# Patient Record
Sex: MALE | Race: WHITE | NOT HISPANIC OR LATINO | Employment: OTHER | URBAN - METROPOLITAN AREA
[De-identification: names, ages, dates, MRNs, and addresses within clinical notes are randomized per-mention and may not be internally consistent; named-entity substitution may affect disease eponyms.]

---

## 2017-11-08 ENCOUNTER — APPOINTMENT (OUTPATIENT)
Dept: ULTRASOUND IMAGING | Facility: CLINIC | Age: 72
End: 2017-11-08
Attending: EMERGENCY MEDICINE
Payer: COMMERCIAL

## 2017-11-08 ENCOUNTER — HOSPITAL ENCOUNTER (EMERGENCY)
Facility: CLINIC | Age: 72
Discharge: HOME | End: 2017-11-08
Attending: EMERGENCY MEDICINE | Admitting: EMERGENCY MEDICINE
Payer: COMMERCIAL

## 2017-11-08 VITALS
RESPIRATION RATE: 16 BRPM | DIASTOLIC BLOOD PRESSURE: 74 MMHG | SYSTOLIC BLOOD PRESSURE: 113 MMHG | HEART RATE: 75 BPM | TEMPERATURE: 97.3 F | OXYGEN SATURATION: 93 %

## 2017-11-08 DIAGNOSIS — R74.8 ELEVATED LIPASE: ICD-10-CM

## 2017-11-08 DIAGNOSIS — R10.10 UPPER ABDOMINAL PAIN: ICD-10-CM

## 2017-11-08 LAB
ALBUMIN SERPL-MCNC: 3.3 G/DL (ref 3.4–5)
ALBUMIN UR-MCNC: NEGATIVE MG/DL
ALP SERPL-CCNC: 63 U/L (ref 40–150)
ALT SERPL W P-5'-P-CCNC: 16 U/L (ref 0–70)
ANION GAP SERPL CALCULATED.3IONS-SCNC: 6 MMOL/L (ref 3–14)
APPEARANCE UR: CLEAR
AST SERPL W P-5'-P-CCNC: 11 U/L (ref 0–45)
BASOPHILS # BLD AUTO: 0 10E9/L (ref 0–0.2)
BASOPHILS NFR BLD AUTO: 0.2 %
BILIRUB SERPL-MCNC: 0.8 MG/DL (ref 0.2–1.3)
BILIRUB UR QL STRIP: NEGATIVE
BUN SERPL-MCNC: 13 MG/DL (ref 7–30)
CALCIUM SERPL-MCNC: 8.2 MG/DL (ref 8.5–10.1)
CHLORIDE SERPL-SCNC: 105 MMOL/L (ref 94–109)
CO2 SERPL-SCNC: 27 MMOL/L (ref 20–32)
COLOR UR AUTO: YELLOW
CREAT SERPL-MCNC: 0.97 MG/DL (ref 0.66–1.25)
DIFFERENTIAL METHOD BLD: ABNORMAL
EOSINOPHIL # BLD AUTO: 0.1 10E9/L (ref 0–0.7)
EOSINOPHIL NFR BLD AUTO: 0.5 %
ERYTHROCYTE [DISTWIDTH] IN BLOOD BY AUTOMATED COUNT: 12.2 % (ref 10–15)
GFR SERPL CREATININE-BSD FRML MDRD: 76 ML/MIN/1.7M2
GLUCOSE SERPL-MCNC: 112 MG/DL (ref 70–99)
GLUCOSE UR STRIP-MCNC: NEGATIVE MG/DL
HCT VFR BLD AUTO: 40.4 % (ref 40–53)
HGB BLD-MCNC: 14.2 G/DL (ref 13.3–17.7)
HGB UR QL STRIP: NEGATIVE
IMM GRANULOCYTES # BLD: 0.1 10E9/L (ref 0–0.4)
IMM GRANULOCYTES NFR BLD: 0.4 %
INTERPRETATION ECG - MUSE: NORMAL
KETONES UR STRIP-MCNC: NEGATIVE MG/DL
LEUKOCYTE ESTERASE UR QL STRIP: NEGATIVE
LIPASE SERPL-CCNC: 905 U/L (ref 73–393)
LYMPHOCYTES # BLD AUTO: 2 10E9/L (ref 0.8–5.3)
LYMPHOCYTES NFR BLD AUTO: 13.7 %
MCH RBC QN AUTO: 32.7 PG (ref 26.5–33)
MCHC RBC AUTO-ENTMCNC: 35.1 G/DL (ref 31.5–36.5)
MCV RBC AUTO: 93 FL (ref 78–100)
MONOCYTES # BLD AUTO: 1.4 10E9/L (ref 0–1.3)
MONOCYTES NFR BLD AUTO: 9.9 %
MUCOUS THREADS #/AREA URNS LPF: PRESENT /LPF
NEUTROPHILS # BLD AUTO: 10.8 10E9/L (ref 1.6–8.3)
NEUTROPHILS NFR BLD AUTO: 75.3 %
NITRATE UR QL: NEGATIVE
NRBC # BLD AUTO: 0 10*3/UL
NRBC BLD AUTO-RTO: 0 /100
PH UR STRIP: 5 PH (ref 5–7)
PLATELET # BLD AUTO: 310 10E9/L (ref 150–450)
POTASSIUM SERPL-SCNC: 4 MMOL/L (ref 3.4–5.3)
PROT SERPL-MCNC: 6.9 G/DL (ref 6.8–8.8)
RBC # BLD AUTO: 4.34 10E12/L (ref 4.4–5.9)
RBC #/AREA URNS AUTO: 0 /HPF (ref 0–2)
SODIUM SERPL-SCNC: 138 MMOL/L (ref 133–144)
SOURCE: ABNORMAL
SP GR UR STRIP: 1.02 (ref 1–1.03)
UROBILINOGEN UR STRIP-MCNC: 0 MG/DL (ref 0–2)
WBC # BLD AUTO: 14.3 10E9/L (ref 4–11)
WBC #/AREA URNS AUTO: 1 /HPF (ref 0–2)

## 2017-11-08 PROCEDURE — 96361 HYDRATE IV INFUSION ADD-ON: CPT

## 2017-11-08 PROCEDURE — 83690 ASSAY OF LIPASE: CPT | Performed by: EMERGENCY MEDICINE

## 2017-11-08 PROCEDURE — 80053 COMPREHEN METABOLIC PANEL: CPT | Performed by: EMERGENCY MEDICINE

## 2017-11-08 PROCEDURE — 36415 COLL VENOUS BLD VENIPUNCTURE: CPT | Performed by: EMERGENCY MEDICINE

## 2017-11-08 PROCEDURE — 81001 URINALYSIS AUTO W/SCOPE: CPT | Performed by: EMERGENCY MEDICINE

## 2017-11-08 PROCEDURE — 93005 ELECTROCARDIOGRAM TRACING: CPT

## 2017-11-08 PROCEDURE — 96374 THER/PROPH/DIAG INJ IV PUSH: CPT

## 2017-11-08 PROCEDURE — 25000128 H RX IP 250 OP 636: Performed by: EMERGENCY MEDICINE

## 2017-11-08 PROCEDURE — 99285 EMERGENCY DEPT VISIT HI MDM: CPT | Mod: 25

## 2017-11-08 PROCEDURE — 25000132 ZZH RX MED GY IP 250 OP 250 PS 637: Performed by: EMERGENCY MEDICINE

## 2017-11-08 PROCEDURE — 85025 COMPLETE CBC W/AUTO DIFF WBC: CPT | Performed by: EMERGENCY MEDICINE

## 2017-11-08 PROCEDURE — 76705 ECHO EXAM OF ABDOMEN: CPT

## 2017-11-08 RX ORDER — OXYCODONE AND ACETAMINOPHEN 5; 325 MG/1; MG/1
2 TABLET ORAL ONCE
Status: COMPLETED | OUTPATIENT
Start: 2017-11-08 | End: 2017-11-08

## 2017-11-08 RX ORDER — ONDANSETRON 2 MG/ML
4 INJECTION INTRAMUSCULAR; INTRAVENOUS ONCE
Status: COMPLETED | OUTPATIENT
Start: 2017-11-08 | End: 2017-11-08

## 2017-11-08 RX ORDER — OXYCODONE AND ACETAMINOPHEN 5; 325 MG/1; MG/1
1-2 TABLET ORAL EVERY 4 HOURS PRN
Qty: 15 TABLET | Refills: 0 | Status: SHIPPED | OUTPATIENT
Start: 2017-11-08

## 2017-11-08 RX ORDER — ONDANSETRON 4 MG/1
4 TABLET, ORALLY DISINTEGRATING ORAL EVERY 8 HOURS PRN
Qty: 10 TABLET | Refills: 0 | Status: SHIPPED | OUTPATIENT
Start: 2017-11-08 | End: 2017-11-11

## 2017-11-08 RX ADMIN — ONDANSETRON 4 MG: 2 INJECTION INTRAMUSCULAR; INTRAVENOUS at 12:04

## 2017-11-08 RX ADMIN — SODIUM CHLORIDE 1000 ML: 9 INJECTION, SOLUTION INTRAVENOUS at 10:24

## 2017-11-08 RX ADMIN — OXYCODONE HYDROCHLORIDE AND ACETAMINOPHEN 2 TABLET: 5; 325 TABLET ORAL at 10:24

## 2017-11-08 ASSESSMENT — ENCOUNTER SYMPTOMS
NAUSEA: 1
MYALGIAS: 1
CHILLS: 1
SHORTNESS OF BREATH: 0
BLOOD IN STOOL: 0
DIARRHEA: 0
ANAL BLEEDING: 0
FEVER: 0
ABDOMINAL PAIN: 1
CONSTIPATION: 0

## 2017-11-08 NOTE — DISCHARGE INSTRUCTIONS
Discharge Instructions  Abdominal Pain    Abdominal pain (belly pain) can be caused by many things. Your evaluation today does not show the exact cause for your pain. Your provider today has decided that it is unlikely your pain is due to a life threatening problem, or a problem requiring surgery or hospital admission. Sometimes those problems cannot be found right away, so it is very important that you follow up as directed.  Sometimes only the changes which occur over time allow the cause of your pain to be found.    Generally, every Emergency Department visit should have a follow-up clinic visit with either a primary or a specialty clinic/provider. Please follow-up as instructed by your emergency provider today. With abdominal pain, we often recommend very close follow-up, such as the following day.    ADULTS:  Return to the Emergency Department right away if:      You get an oral temperature above 102oF or as directed by your provider.    You have blood in your stools. This may be bright red or appear as black, tarry stools.      You keep vomiting (throwing up) or cannot drink liquids.    You see blood when you vomit.     You cannot have a bowel movement or you cannot pass gas.    Your stomach gets bloated or bigger.    Your skin or the whites of your eyes look yellow.    You faint.    You have bloody, frequent or painful urination (peeing).    You have new symptoms or anything that worries you.      MORE INFORMATION:    Appendicitis:  A possible cause of abdominal pain in any person who still has their appendix is acute appendicitis. Appendicitis is often hard to diagnose.  Testing does not always rule out early appendicitis or other causes of abdominal pain. Close follow-up with your provider and re-evaluations may be needed to figure out the reason for your abdominal pain.    Follow-up:  It is very important that you make an appointment with your clinic and go to the appointment.  If you do not follow-up with  "your primary provider, it may result in missing an important development which could result in permanent injury or disability and/or lasting pain.  If there is any problem keeping your appointment, call your provider or return to the Emergency Department.    Medications:  Take your medications as directed by your provider today.  Before using over-the-counter medications, ask your provider and make sure to take the medications as directed.  If you have any questions about medications, ask your provider.    Diet:  Resume your normal diet as much as possible, but do not eat fried, fatty or spicy foods while you have pain.  Do not drink alcohol or have caffeine.  Do not smoke tobacco.    Probiotics: If you have been given an antibiotic, you may want to also take a probiotic pill or eat yogurt with live cultures. Probiotics have \"good bacteria\" to help your intestines stay healthy. Studies have shown that probiotics help prevent diarrhea (loose stools) and other intestine problems (including C. diff infection) when you take antibiotics. You can buy these without a prescription in the pharmacy section of the store.     If you were given a prescription for medicine here today, be sure to read all of the information (including the package insert) that comes with your prescription.  This will include important information about the medicine, its side effects, and any warnings that you need to know about.  The pharmacist who fills the prescription can provide more information and answer questions you may have about the medicine.  If you have questions or concerns that the pharmacist cannot address, please call or return to the Emergency Department.       Remember that you can always come back to the Emergency Department if you are not able to see your regular provider in the amount of time listed above, if you get any new symptoms, or if there is anything that worries you.       "

## 2017-11-08 NOTE — ED AVS SNAPSHOT
[facility] Emergency Department    [address]    [facility] [address]    Phone:  [phone]    Fax:  [phone]                                       Austin Garcia   MRN: [id number]    Department:  [facility] Emergency Department   Date of Visit:  11/8/2017           After Visit Summary Signature Page     I have received my discharge instructions, and my questions have been answered. I have discussed any challenges I see with this plan with the nurse or doctor.    ..........................................................................................................................................  Patient/Patient Representative Signature      ..........................................................................................................................................  Patient Representative Print Name and Relationship to Patient    ..................................................               ................................................  Date                                            Time    ..........................................................................................................................................  Reviewed by Signature/Title    ...................................................              ..............................................  Date                                                            Time

## 2017-11-08 NOTE — ED PROVIDER NOTES
"  History     Chief Complaint:  Abdominal Pain    HPI   Austin Garcia is a 71 year old male who presents with a history of appendectomy to the emergency department today for evaluation of abdominal pain and is accompanied by his wife. The patient reports having body aches over the past few days as he thought he was beginning to have the flu, and yesterday, reports achy abdominal pain beginning at 1900, one hour after having pasta for dinner. Pain was generalized initially, then settled to the right side from \"the bottom of the rib cage down to the belt line,\" most prominently in the right upper quadrant. The patient had chills yesterday, and woke up due to pain with nausea at 0300 this morning. He took Advil and drank water with some alleviation of symptoms. Movement does not worsen pain. The patient denies chest pain, shortness of breath, fever, vomiting, black or bloody stools, diarrhea, and notes that he has been passing hard stools every day. The patient also denies history of renal, hepatic, pancreatic, or biliary problems, and does not regularly use ibuprofen or alcohol.    Allergies:  Tetanus    Medications:    Pravastatin  Lisinopril  Lexapro    Past Medical History:    Hypertension  Hyperlipidemia  Anxiety  Colon polyps  Vitamin D deficiency  Epiretinal membrane, left eye    Past Surgical History:    Appendectomy    Family History:    History reviewed. No pertinent family history.     Social History:  The patient was accompanied to the ED by his wife.  Marital Status:   [2]     Review of Systems   Constitutional: Positive for chills. Negative for fever.   Respiratory: Negative for shortness of breath.    Cardiovascular: Negative for chest pain.   Gastrointestinal: Positive for abdominal pain and nausea. Negative for anal bleeding, blood in stool, constipation and diarrhea.   Musculoskeletal: Positive for myalgias.   All other systems reviewed and are negative.    Physical Exam   Vitals:  Patient " Vitals for the past 24 hrs:   BP Temp Temp src Pulse Heart Rate Resp SpO2   11/08/17 1215 113/74 - - - - - 93 %   11/08/17 1145 110/71 - - - - - 93 %   11/08/17 1130 118/74 - - - - - -   11/08/17 1115 115/74 - - - - - 92 %   11/08/17 1100 129/77 - - - - - 95 %   11/08/17 1045 136/77 - - - - - 97 %   11/08/17 1030 128/89 - - - - - 95 %   11/08/17 1015 119/72 - - - - - 94 %   11/08/17 1000 127/70 - - - - - 95 %   11/08/17 0945 120/75 - - - - - 93 %   11/08/17 0930 124/75 - - - - - 95 %   11/08/17 0926 - - - - - - 95 %   11/08/17 0925 122/76 - - - - - -   11/08/17 0900 - - - - 69 - 95 %   11/08/17 0747 136/85 97.3  F (36.3  C) Oral 75 - 16 98 %     Physical Exam  General: Adult male sitting upright  Eyes: PERRL, Conjunctive within normal limits. No scleral icterus  ENT: Moist mucous membranes, oropharynx clear.   CV: Normal S1S2, no murmur, rub or gallop. Regular rate and rhythm  Resp: Clear to auscultation bilaterally, no wheezes, rales or rhonchi. Normal respiratory effort.  GI: Tender in the right upper quadrant with positive Amaya's sign. Mild epigastric tenderness. Abdomen is soft and nondistended. No palpable masses. No rebound or guarding.  MSK: No edema. Nontender. Normal active range of motion.  Skin: Warm and dry. No rashes or lesions or ecchymoses on visible skin.  Neuro: Alert and oriented. Responds appropriately to all questions and commands. No focal findings appreciated. Normal muscle tone.  Psych: Normal mood and affect. Pleasant.    Emergency Department Course     ECG:  ECG taken at 0853, ECG read at 0902  Normal sinus rhythm  Left axis deviation  Abnormal ECG  Rate 68 bpm. WI interval 176. QRS duration 110. QT/QTc 400/425. P-R-T axes 40 -39 -5.    Imaging:  Radiology findings were communicated with the patient and family who voiced understanding of the findings.    Abdomen US, limited (RUQ only)  1. No cholelithiasis  2. Probable hepatic fatty infiltration  Reading per  radiology    Laboratory:  Laboratory findings were communicated with the patient and family who voiced understanding of the findings.    CBC: WBC 14.3 (H) o/w WNL. (HGB 14.2, )   CMP: Glucose 112 (H), Calcium 8.2 (L), Albumin 3.3 (L) o/w WNL (Creatinine 0.97)  Lipase: 905 (H)    UA with micro: Mucous present (A) o/w negative    Interventions:  1024 ns 1 L IV  1024 Percocet 2 tablets PO  1204 Zofran 4 mg IV     Emergency Department Course:  Nursing notes and vitals reviewed.  I performed an exam of the patient as documented above.   IV was inserted and blood was drawn for laboratory testing, results above.  The patient provided a urine sample here in the emergency department. This was sent for laboratory testing, findings above.  The patient was sent for a Abdomen US, limited (RUQ only) while in the emergency department, results above.   At 0950 the patient was rechecked and was updated on the results of laboratory and imaging studies. The patient's pain was mild increased and the patient was minimally tender to deep palpation.  At 1146 the patient was rechecked and was feeling improved after the above interventions. Tolerated po challenge and feels comfortable with the plan for discharge.  I discussed the treatment plan with the patient and his wife. They expressed understanding of this plan and consented to discharge. He will be discharged home with instructions for care and follow up. In addition, the patient will return to the emergency department if their symptoms worsen, if new symptoms arise or if there is any concern.  All questions were answered.  I personally reviewed the laboratory and imaging results with the patient and his wife and answered all related questions prior to discharge.    Impression & Plan      Medical Decision Making:  Austin Garcia is a 71 year old male who presents to the emergency department with concerns for abdominal pain starting last night into this morning. It is  predominantly located in the right upper quadrant. Mild epigastric tenderness as well. His laboratory evaluation was consistent with slight leukocytosis. Right upper quadrant ultrasound did not show any acute appearing abnormalities. His symptoms resolved over his stay. His lipase was slightly elevated, not to the level that would technically define pancreatitis but I suspect this may be what the patient is suffering from, either early in course or mild. As he had resolution of his symptoms here, controlled with medications, he will be sent home with similar medications. He tolerated liquid here. Recommended appropriate diet and close follow up with a primary care doctor. He is aware that he should return immediately to the emergency department if his symptoms worsen. Follow up in 2-3 days with his primary care doctor. All questions answered prior to discharge.    Diagnosis:    ICD-10-CM    1. Upper abdominal pain R10.10    2. Elevated lipase R74.8      Disposition:   Home    Discharge Medications:  Discharge Medication List as of 11/8/2017 11:58 AM      START taking these medications    Details   oxyCODONE-acetaminophen (PERCOCET) 5-325 MG per tablet Take 1-2 tablets by mouth every 4 hours as needed for pain, Disp-15 tablet, R-0, Local Print      ranitidine (ZANTAC) 150 MG tablet Take 1 tablet (150 mg) by mouth 2 times daily for 10 days, Disp-20 tablet, R-0, Local Print           Scribe Disclosure:  Fidencio AHUJA, am serving as a scribe at 7:55 AM on 11/8/2017 to document services personally performed by Britany Stover MD, based on my observations and the provider's statements to me.    11/8/2017   [facility] EMERGENCY DEPARTMENT       Britany Stover MD  11/09/17 1999

## 2017-11-08 NOTE — ED AVS SNAPSHOT
[facility]    [address]    [facility] [address]    Phone:  [phone]    Fax:  [phone]                                       Austin Garcia   MRN: [id number]    Department:  [facility]   Date of Visit:  11/8/2017           Patient Information     Date Of Birth          1945        Your diagnoses for this visit were:     Upper abdominal pain     Elevated lipase        You were seen by Britany Stover MD.      Follow-up Information     Follow up with Brent Suresh MD.    Specialty:  Family Practice    Why:  within 2-3 days for reassessment    Contact information:    [facility]  [id number] [place] [address] [phone]          Follow up with [facility].    Specialty:  EMERGENCY MEDICINE    Why:  As needed, If symptoms worsen    Contact information:    [address]  [facility] [address] [phone]        Discharge Instructions       Discharge Instructions  Abdominal Pain    Abdominal pain (belly pain) can be caused by many things. Your evaluation today does not show the exact cause for your pain. Your provider today has decided that it is unlikely your pain is due to a life threatening problem, or a problem requiring surgery or hospital admission. Sometimes those problems cannot be found right away, so it is very important that you follow up as directed.  Sometimes only the changes which occur over time allow the cause of your pain to be found.    Generally, every Emergency Department visit should have a follow-up clinic visit with either a primary or a specialty clinic/provider. Please follow-up as instructed by your emergency provider today. With abdominal pain, we often recommend very close follow-up, such as the following day.    ADULTS:  Return to the Emergency Department right away if:      You get an oral temperature  "above 102oF or as directed by your provider.    You have blood in your stools. This may be bright red or appear as black, tarry stools.      You keep vomiting (throwing up) or cannot drink liquids.    You see blood when you vomit.     You cannot have a bowel movement or you cannot pass gas.    Your stomach gets bloated or bigger.    Your skin or the whites of your eyes look yellow.    You faint.    You have bloody, frequent or painful urination (peeing).    You have new symptoms or anything that worries you.      MORE INFORMATION:    Appendicitis:  A possible cause of abdominal pain in any person who still has their appendix is acute appendicitis. Appendicitis is often hard to diagnose.  Testing does not always rule out early appendicitis or other causes of abdominal pain. Close follow-up with your provider and re-evaluations may be needed to figure out the reason for your abdominal pain.    Follow-up:  It is very important that you make an appointment with your clinic and go to the appointment.  If you do not follow-up with your primary provider, it may result in missing an important development which could result in permanent injury or disability and/or lasting pain.  If there is any problem keeping your appointment, call your provider or return to the Emergency Department.    Medications:  Take your medications as directed by your provider today.  Before using over-the-counter medications, ask your provider and make sure to take the medications as directed.  If you have any questions about medications, ask your provider.    Diet:  Resume your normal diet as much as possible, but do not eat fried, fatty or spicy foods while you have pain.  Do not drink alcohol or have caffeine.  Do not smoke tobacco.    Probiotics: If you have been given an antibiotic, you may want to also take a probiotic pill or eat yogurt with live cultures. Probiotics have \"good bacteria\" to help your intestines stay healthy. Studies have " shown that probiotics help prevent diarrhea (loose stools) and other intestine problems (including C. diff infection) when you take antibiotics. You can buy these without a prescription in the pharmacy section of the store.     If you were given a prescription for medicine here today, be sure to read all of the information (including the package insert) that comes with your prescription.  This will include important information about the medicine, its side effects, and any warnings that you need to know about.  The pharmacist who fills the prescription can provide more information and answer questions you may have about the medicine.  If you have questions or concerns that the pharmacist cannot address, please call or return to the Emergency Department.       Remember that you can always come back to the Emergency Department if you are not able to see your regular provider in the amount of time listed above, if you get any new symptoms, or if there is anything that worries you.         Discharge References/Attachments     PANCREATITIS (ENGLISH)      24 Hour Appointment Hotline       To make an appointment at any [facility], call [phone] ([phone]). If you don't have a family doctor or clinic, we will help you find one. [place] clinics are conveniently located to serve the needs of you and your family.             Review of your medicines      START taking        Dose / Directions Last dose taken    oxyCODONE-acetaminophen 5-325 MG per tablet   Commonly known as:  PERCOCET   Dose:  1-2 tablet   Quantity:  15 tablet        Take 1-2 tablets by mouth every 4 hours as needed for pain   Refills:  0        ranitidine 150 MG tablet   Commonly known as:  ZANTAC   Dose:  150 mg   Quantity:  20 tablet        Take 1 tablet (150 mg) by mouth 2 times daily for 10 days   Refills:  0          Our records show that you are taking the medicines listed below. If these are incorrect, please call your family doctor or  clinic.        Dose / Directions Last dose taken    LEXAPRO PO        Refills:  0        LISINOPRIL PO   Dose:  5 mg        Take 5 mg by mouth   Refills:  0                Prescriptions were sent or printed at these locations (2 Prescriptions)                   Other Prescriptions                Printed at Department/Unit printer (2 of 2)         oxyCODONE-acetaminophen (PERCOCET) 5-325 MG per tablet               ranitidine (ZANTAC) 150 MG tablet                Procedures and tests performed during your visit     Abdomen US, limited (RUQ only)    CBC with platelets differential    Cardiac Continuous Monitoring    Comprehensive metabolic panel    EKG 12-lead, tracing only    Lipase    Peripheral IV: Standard    Pulse oximetry nursing    UA with Microscopic      Orders Needing Specimen Collection     None      Pending Results     Date and Time Order Name Status Description    11/8/2017 0754 Abdomen US, limited (RUQ only) Preliminary             Pending Culture Results     No orders found from 11/6/2017 to 11/9/2017.            Pending Results Instructions     If you had any lab results that were not finalized at the time of your Discharge, you can call the ED Lab Result RN at [phone]. You will be contacted by this team for any positive Lab results or changes in treatment. The nurses are available 7 days a week from 10A to 6:30P.  You can leave a message 24 hours per day and they will return your call.        Test Results From Your Hospital Stay        11/8/2017  9:29 AM      Narrative     ULTRASOUND ABDOMEN LIMITED  11/8/2017 9:22 AM     HISTORY: Abdomen pain. Right upper quadrant abdomen pain after eating  last night.    FINDINGS: No cholelithiasis or gallbladder wall thickening. No biliary  dilatation. Increased hepatic echotexture suggests fatty infiltration.  The right kidney and visualized portion of the pancreas appear within  normal limits.        Impression     IMPRESSION:  1. No cholelithiasis.  2.  Probable hepatic fatty infiltration.         11/8/2017  8:24 AM      Component Results     Component Value Ref Range & Units Status    Color Urine Yellow  Final    Appearance Urine Clear  Final    Glucose Urine Negative NEG^Negative mg/dL Final    Bilirubin Urine Negative NEG^Negative Final    Ketones Urine Negative NEG^Negative mg/dL Final    Specific Gravity Urine 1.018 1.003 - 1.035 Final    Blood Urine Negative NEG^Negative Final    pH Urine 5.0 5.0 - 7.0 pH Final    Protein Albumin Urine Negative NEG^Negative mg/dL Final    Urobilinogen mg/dL 0.0 0.0 - 2.0 mg/dL Final    Nitrite Urine Negative NEG^Negative Final    Leukocyte Esterase Urine Negative NEG^Negative Final    Source Midstream Urine  Final    WBC Urine 1 0 - 2 /HPF Final    RBC Urine 0 0 - 2 /HPF Final    Mucous Urine Present (A) NEG^Negative /LPF Final         11/8/2017  9:20 AM      Component Results     Component Value Ref Range & Units Status    WBC 14.3 (H) 4.0 - 11.0 10e9/L Final    RBC Count 4.34 (L) 4.4 - 5.9 10e12/L Final    Hemoglobin 14.2 13.3 - 17.7 g/dL Final    Hematocrit 40.4 40.0 - 53.0 % Final    MCV 93 78 - 100 fl Final    MCH 32.7 26.5 - 33.0 pg Final    MCHC 35.1 31.5 - 36.5 g/dL Final    RDW 12.2 10.0 - 15.0 % Final    Platelet Count 310 150 - 450 10e9/L Final    Diff Method Automated Method  Final    % Neutrophils 75.3 % Final    % Lymphocytes 13.7 % Final    % Monocytes 9.9 % Final    % Eosinophils 0.5 % Final    % Basophils 0.2 % Final    % Immature Granulocytes 0.4 % Final    Nucleated RBCs 0 0 /100 Final    Absolute Neutrophil 10.8 (H) 1.6 - 8.3 10e9/L Final    Absolute Lymphocytes 2.0 0.8 - 5.3 10e9/L Final    Absolute Monocytes 1.4 (H) 0.0 - 1.3 10e9/L Final    Absolute Eosinophils 0.1 0.0 - 0.7 10e9/L Final    Absolute Basophils 0.0 0.0 - 0.2 10e9/L Final    Abs Immature Granulocytes 0.1 0 - 0.4 10e9/L Final    Absolute Nucleated RBC 0.0  Final         11/8/2017  9:36 AM      Component Results     Component Value Ref Range  & Units Status    Sodium 138 133 - 144 mmol/L Final    Potassium 4.0 3.4 - 5.3 mmol/L Final    Chloride 105 94 - 109 mmol/L Final    Carbon Dioxide 27 20 - 32 mmol/L Final    Anion Gap 6 3 - 14 mmol/L Final    Glucose 112 (H) 70 - 99 mg/dL Final    Urea Nitrogen 13 7 - 30 mg/dL Final    Creatinine 0.97 0.66 - 1.25 mg/dL Final    GFR Estimate 76 >60 mL/min/1.7m2 Final    Non  GFR Calc    GFR Estimate If Black >90 >60 mL/min/1.7m2 Final    African American GFR Calc    Calcium 8.2 (L) 8.5 - 10.1 mg/dL Final    Bilirubin Total 0.8 0.2 - 1.3 mg/dL Final    Albumin 3.3 (L) 3.4 - 5.0 g/dL Final    Protein Total 6.9 6.8 - 8.8 g/dL Final    Alkaline Phosphatase 63 40 - 150 U/L Final    ALT 16 0 - 70 U/L Final    AST 11 0 - 45 U/L Final         11/8/2017  9:36 AM      Component Results     Component Value Ref Range & Units Status    Lipase 905 (H) 73 - 393 U/L Final                Clinical Quality Measure: Blood Pressure Screening     Your blood pressure was checked while you were in the emergency department today. The last reading we obtained was  BP: 118/74 . Please read the guidelines below about what these numbers mean and what you should do about them.  If your systolic blood pressure (the top number) is less than 120 and your diastolic blood pressure (the bottom number) is less than 80, then your blood pressure is normal. There is nothing more that you need to do about it.  If your systolic blood pressure (the top number) is 120-139 or your diastolic blood pressure (the bottom number) is 80-89, your blood pressure may be higher than it should be. You should have your blood pressure rechecked within a year by a primary care provider.  If your systolic blood pressure (the top number) is 140 or greater or your diastolic blood pressure (the bottom number) is 90 or greater, you may have high blood pressure. High blood pressure is treatable, but if left untreated over time it can put you at risk for heart  "attack, stroke, or kidney failure. You should have your blood pressure rechecked by a primary care provider within the next 4 weeks.  If your provider in the emergency department today gave you specific instructions to follow-up with your doctor or provider even sooner than that, you should follow that instruction and not wait for up to 4 weeks for your follow-up visit.        Thank you for choosing [facility]       Thank you for choosing [facility] for your care. Our goal is always to provide you with excellent care. Hearing back from our patients is one way we can continue to improve our services. Please take a few minutes to complete the written survey that you may receive in the mail after you visit with us. Thank you!        [employer] Information     [employer] lets you send messages to your doctor, view your test results, renew your prescriptions, schedule appointments and more. To sign up, go to [email or web address] . Click on \"Log in\" on the left side of the screen, which will take you to the Welcome page. Then click on \"Sign up Now\" on the right side of the page.     You will be asked to enter the access code listed below, as well as some personal information. Please follow the directions to create your username and password.     Your access code is: [email or web address]  Expires: 2018 11:53 AM     Your access code will  in 90 days. If you need help or a new code, please call your [facility] clinic or [phone].        Care EveryWhere ID     This is your Care EveryWhere ID. This could be used by other organizations to access your [facility] medical records  [id number]        Equal Access to Services     FRANCHESKA RASHID : Hadii garett Chakraborty, waaxda luqadaha, qaybta kaalmahector ramirez, elly godinez. So [facility] [phone].    ATENCIÓN: Si habla español, tiene a noyola disposición servicios gratuitos de asistencia lingüística. Llame al [phone].    We comply with applicable " federal civil rights laws and [place] laws. We do not discriminate on the basis of race, color, national origin, age, disability, sex, sexual orientation, or gender identity.            After Visit Summary       This is your record. Keep this with you and show to your community pharmacist(s) and doctor(s) at your next visit.

## 2021-09-13 NOTE — ED NOTES
"Patient states that after he ate last evening around 1900 he had upper right abdominal pain \"rib cage to belt line, front and back\". Pain 4/10. Denies vomiting has occasional nausea.  "
Patient feeling better, drinking water, ready to go home.  
Pt given water.   
Pt has drank a large glass of water, bolus complete and pt is pain-free.   
Pt rates pain 0/10. Pt is drinking water and fluids are infusing.   
PAST SURGICAL HISTORY:  H/O hernia repair     History of appendectomy

## 2022-07-28 ENCOUNTER — MEDICAL CORRESPONDENCE (OUTPATIENT)
Dept: HEALTH INFORMATION MANAGEMENT | Facility: CLINIC | Age: 77
End: 2022-07-28

## 2022-07-28 ENCOUNTER — TRANSFERRED RECORDS (OUTPATIENT)
Dept: HEALTH INFORMATION MANAGEMENT | Facility: CLINIC | Age: 77
End: 2022-07-28

## 2022-08-19 ENCOUNTER — HOSPITAL ENCOUNTER (OUTPATIENT)
Dept: CARDIOLOGY | Facility: CLINIC | Age: 77
Discharge: HOME | End: 2022-08-19
Attending: NEUROLOGICAL SURGERY | Admitting: NEUROLOGICAL SURGERY
Payer: COMMERCIAL

## 2022-08-19 DIAGNOSIS — G45.9 TIA (TRANSIENT ISCHEMIC ATTACK): ICD-10-CM

## 2022-08-19 DIAGNOSIS — H53.9 VISUAL DISTURBANCE: ICD-10-CM

## 2022-08-19 LAB — LVEF ECHO: NORMAL

## 2022-08-19 PROCEDURE — 258N000001 HC RX 258: Performed by: INTERNAL MEDICINE

## 2022-08-19 PROCEDURE — 255N000002 HC RX 255 OP 636: Performed by: INTERNAL MEDICINE

## 2022-08-19 PROCEDURE — 999N000208 ECHOCARDIOGRAM COMPLETE

## 2022-08-19 PROCEDURE — 93306 TTE W/DOPPLER COMPLETE: CPT | Mod: 26 | Performed by: INTERNAL MEDICINE

## 2022-08-19 RX ORDER — ACYCLOVIR 200 MG/1
30 CAPSULE ORAL ONCE
Status: COMPLETED | OUTPATIENT
Start: 2022-08-19 | End: 2022-08-19

## 2022-08-19 RX ADMIN — HUMAN ALBUMIN MICROSPHERES AND PERFLUTREN 3 ML: 10; .22 INJECTION, SOLUTION INTRAVENOUS at 15:22

## 2022-08-19 RX ADMIN — SODIUM CHLORIDE 30 ML: 9 INJECTION, SOLUTION INTRAMUSCULAR; INTRAVENOUS; SUBCUTANEOUS at 15:23

## 2022-11-29 DIAGNOSIS — H53.9 VISUAL DISTURBANCE: ICD-10-CM

## 2022-11-29 DIAGNOSIS — G45.9 TIA (TRANSIENT ISCHEMIC ATTACK): Primary | ICD-10-CM

## 2022-12-06 ENCOUNTER — HOSPITAL ENCOUNTER (OUTPATIENT)
Dept: CARDIOLOGY | Facility: CLINIC | Age: 77
Discharge: HOME | End: 2022-12-06
Attending: PSYCHIATRY & NEUROLOGY | Admitting: PSYCHIATRY & NEUROLOGY
Payer: COMMERCIAL

## 2022-12-06 DIAGNOSIS — H53.9 VISUAL DISTURBANCE: ICD-10-CM

## 2022-12-06 DIAGNOSIS — G45.9 TIA (TRANSIENT ISCHEMIC ATTACK): ICD-10-CM

## 2022-12-06 PROCEDURE — 93227 XTRNL ECG REC<48 HR R&I: CPT | Performed by: INTERNAL MEDICINE

## 2022-12-06 PROCEDURE — 93225 XTRNL ECG REC<48 HRS REC: CPT

## 2023-11-25 ENCOUNTER — APPOINTMENT (OUTPATIENT)
Dept: CT IMAGING | Facility: CLINIC | Age: 78
End: 2023-11-25
Attending: EMERGENCY MEDICINE
Payer: COMMERCIAL

## 2023-11-25 ENCOUNTER — APPOINTMENT (OUTPATIENT)
Dept: MRI IMAGING | Facility: CLINIC | Age: 78
End: 2023-11-25
Attending: EMERGENCY MEDICINE
Payer: COMMERCIAL

## 2023-11-25 ENCOUNTER — HOSPITAL ENCOUNTER (EMERGENCY)
Facility: CLINIC | Age: 78
Discharge: HOME | End: 2023-11-25
Attending: EMERGENCY MEDICINE | Admitting: EMERGENCY MEDICINE
Payer: COMMERCIAL

## 2023-11-25 VITALS
SYSTOLIC BLOOD PRESSURE: 122 MMHG | HEART RATE: 78 BPM | DIASTOLIC BLOOD PRESSURE: 77 MMHG | OXYGEN SATURATION: 98 % | RESPIRATION RATE: 18 BRPM | TEMPERATURE: 97 F

## 2023-11-25 DIAGNOSIS — R51.9 NONINTRACTABLE HEADACHE, UNSPECIFIED CHRONICITY PATTERN, UNSPECIFIED HEADACHE TYPE: ICD-10-CM

## 2023-11-25 DIAGNOSIS — R42 DIZZINESS: ICD-10-CM

## 2023-11-25 DIAGNOSIS — D32.9 MENINGIOMA (H): ICD-10-CM

## 2023-11-25 DIAGNOSIS — R55 VASOVAGAL NEAR SYNCOPE: Primary | ICD-10-CM

## 2023-11-25 DIAGNOSIS — S16.1XXA STRAIN OF NECK MUSCLE, INITIAL ENCOUNTER: ICD-10-CM

## 2023-11-25 LAB
ALBUMIN UR-MCNC: NEGATIVE MG/DL
ANION GAP SERPL CALCULATED.3IONS-SCNC: 10 MMOL/L (ref 7–15)
APPEARANCE UR: CLEAR
BASOPHILS # BLD AUTO: 0 10E3/UL (ref 0–0.2)
BASOPHILS NFR BLD AUTO: 0 %
BILIRUB UR QL STRIP: NEGATIVE
BUN SERPL-MCNC: 12.9 MG/DL (ref 8–23)
CALCIUM SERPL-MCNC: 9 MG/DL (ref 8.8–10.2)
CHLORIDE SERPL-SCNC: 103 MMOL/L (ref 98–107)
COLOR UR AUTO: ABNORMAL
CREAT SERPL-MCNC: 0.95 MG/DL (ref 0.67–1.17)
DEPRECATED HCO3 PLAS-SCNC: 25 MMOL/L (ref 22–29)
EGFRCR SERPLBLD CKD-EPI 2021: 83 ML/MIN/1.73M2
EOSINOPHIL # BLD AUTO: 0.1 10E3/UL (ref 0–0.7)
EOSINOPHIL NFR BLD AUTO: 1 %
ERYTHROCYTE [DISTWIDTH] IN BLOOD BY AUTOMATED COUNT: 12.8 % (ref 10–15)
FLUAV RNA SPEC QL NAA+PROBE: NEGATIVE
FLUBV RNA RESP QL NAA+PROBE: NEGATIVE
GLUCOSE SERPL-MCNC: 132 MG/DL (ref 70–99)
GLUCOSE UR STRIP-MCNC: NEGATIVE MG/DL
HCT VFR BLD AUTO: 42.6 % (ref 40–53)
HGB BLD-MCNC: 15 G/DL (ref 13.3–17.7)
HGB UR QL STRIP: NEGATIVE
HOLD SPECIMEN: NORMAL
HOLD SPECIMEN: NORMAL
IMM GRANULOCYTES # BLD: 0 10E3/UL
IMM GRANULOCYTES NFR BLD: 1 %
KETONES UR STRIP-MCNC: NEGATIVE MG/DL
LEUKOCYTE ESTERASE UR QL STRIP: NEGATIVE
LYMPHOCYTES # BLD AUTO: 2.6 10E3/UL (ref 0.8–5.3)
LYMPHOCYTES NFR BLD AUTO: 31 %
MAGNESIUM SERPL-MCNC: 2.2 MG/DL (ref 1.7–2.3)
MCH RBC QN AUTO: 32.9 PG (ref 26.5–33)
MCHC RBC AUTO-ENTMCNC: 35.2 G/DL (ref 31.5–36.5)
MCV RBC AUTO: 93 FL (ref 78–100)
MONOCYTES # BLD AUTO: 0.7 10E3/UL (ref 0–1.3)
MONOCYTES NFR BLD AUTO: 8 %
MUCOUS THREADS #/AREA URNS LPF: PRESENT /LPF
NEUTROPHILS # BLD AUTO: 5 10E3/UL (ref 1.6–8.3)
NEUTROPHILS NFR BLD AUTO: 59 %
NITRATE UR QL: NEGATIVE
NRBC # BLD AUTO: 0 10E3/UL
NRBC BLD AUTO-RTO: 0 /100
PH UR STRIP: 6 [PH] (ref 5–7)
PLATELET # BLD AUTO: 414 10E3/UL (ref 150–450)
POTASSIUM SERPL-SCNC: 4.2 MMOL/L (ref 3.4–5.3)
RBC # BLD AUTO: 4.56 10E6/UL (ref 4.4–5.9)
RBC URINE: <1 /HPF
RSV RNA SPEC NAA+PROBE: NEGATIVE
SARS-COV-2 RNA RESP QL NAA+PROBE: NEGATIVE
SODIUM SERPL-SCNC: 138 MMOL/L (ref 135–145)
SP GR UR STRIP: 1.03 (ref 1–1.03)
TROPONIN T SERPL HS-MCNC: 10 NG/L
TROPONIN T SERPL HS-MCNC: 10 NG/L
UROBILINOGEN UR STRIP-MCNC: NORMAL MG/DL
WBC # BLD AUTO: 8.4 10E3/UL (ref 4–11)
WBC URINE: 1 /HPF

## 2023-11-25 PROCEDURE — 250N000009 HC RX 250: Performed by: EMERGENCY MEDICINE

## 2023-11-25 PROCEDURE — 70553 MRI BRAIN STEM W/O & W/DYE: CPT

## 2023-11-25 PROCEDURE — 80048 BASIC METABOLIC PNL TOTAL CA: CPT | Performed by: EMERGENCY MEDICINE

## 2023-11-25 PROCEDURE — 250N000011 HC RX IP 250 OP 636: Performed by: EMERGENCY MEDICINE

## 2023-11-25 PROCEDURE — 85025 COMPLETE CBC W/AUTO DIFF WBC: CPT | Performed by: EMERGENCY MEDICINE

## 2023-11-25 PROCEDURE — 96361 HYDRATE IV INFUSION ADD-ON: CPT

## 2023-11-25 PROCEDURE — 250N000013 HC RX MED GY IP 250 OP 250 PS 637: Performed by: EMERGENCY MEDICINE

## 2023-11-25 PROCEDURE — 70496 CT ANGIOGRAPHY HEAD: CPT

## 2023-11-25 PROCEDURE — 70450 CT HEAD/BRAIN W/O DYE: CPT | Mod: XU

## 2023-11-25 PROCEDURE — 255N000002 HC RX 255 OP 636: Performed by: EMERGENCY MEDICINE

## 2023-11-25 PROCEDURE — A9585 GADOBUTROL INJECTION: HCPCS | Performed by: EMERGENCY MEDICINE

## 2023-11-25 PROCEDURE — 99285 EMERGENCY DEPT VISIT HI MDM: CPT | Mod: 25

## 2023-11-25 PROCEDURE — 70498 CT ANGIOGRAPHY NECK: CPT

## 2023-11-25 PROCEDURE — 36415 COLL VENOUS BLD VENIPUNCTURE: CPT | Performed by: EMERGENCY MEDICINE

## 2023-11-25 PROCEDURE — 96360 HYDRATION IV INFUSION INIT: CPT | Mod: 59

## 2023-11-25 PROCEDURE — 93005 ELECTROCARDIOGRAM TRACING: CPT

## 2023-11-25 PROCEDURE — 81003 URINALYSIS AUTO W/O SCOPE: CPT | Performed by: EMERGENCY MEDICINE

## 2023-11-25 PROCEDURE — 258N000003 HC RX IP 258 OP 636: Performed by: EMERGENCY MEDICINE

## 2023-11-25 PROCEDURE — 87637 SARSCOV2&INF A&B&RSV AMP PRB: CPT | Performed by: EMERGENCY MEDICINE

## 2023-11-25 PROCEDURE — 83735 ASSAY OF MAGNESIUM: CPT | Performed by: EMERGENCY MEDICINE

## 2023-11-25 PROCEDURE — 84484 ASSAY OF TROPONIN QUANT: CPT | Performed by: EMERGENCY MEDICINE

## 2023-11-25 RX ORDER — GADOBUTROL 604.72 MG/ML
9 INJECTION INTRAVENOUS ONCE
Status: COMPLETED | OUTPATIENT
Start: 2023-11-25 | End: 2023-11-25

## 2023-11-25 RX ORDER — ACETAMINOPHEN 325 MG/1
650 TABLET ORAL ONCE
Status: COMPLETED | OUTPATIENT
Start: 2023-11-25 | End: 2023-11-25

## 2023-11-25 RX ORDER — IOPAMIDOL 755 MG/ML
500 INJECTION, SOLUTION INTRAVASCULAR ONCE
Status: COMPLETED | OUTPATIENT
Start: 2023-11-25 | End: 2023-11-25

## 2023-11-25 RX ADMIN — IOPAMIDOL 67 ML: 755 INJECTION, SOLUTION INTRAVENOUS at 12:37

## 2023-11-25 RX ADMIN — GADOBUTROL 9 ML: 604.72 INJECTION INTRAVENOUS at 11:42

## 2023-11-25 RX ADMIN — SODIUM CHLORIDE, POTASSIUM CHLORIDE, SODIUM LACTATE AND CALCIUM CHLORIDE 1000 ML: 600; 310; 30; 20 INJECTION, SOLUTION INTRAVENOUS at 10:50

## 2023-11-25 RX ADMIN — SODIUM CHLORIDE 80 ML: 9 INJECTION, SOLUTION INTRAVENOUS at 12:37

## 2023-11-25 RX ADMIN — ACETAMINOPHEN 650 MG: 325 TABLET, FILM COATED ORAL at 14:37

## 2023-11-25 ASSESSMENT — ENCOUNTER SYMPTOMS
NUMBNESS: 0
NECK PAIN: 0
COUGH: 0
ABDOMINAL PAIN: 0
RHINORRHEA: 0
CHILLS: 0
HEMATURIA: 0
LIGHT-HEADEDNESS: 1
VOMITING: 0
NAUSEA: 0
HEADACHES: 1
SHORTNESS OF BREATH: 0
DYSURIA: 0
BACK PAIN: 0
FEVER: 0
WEAKNESS: 0

## 2023-11-25 ASSESSMENT — ACTIVITIES OF DAILY LIVING (ADL)
ADLS_ACUITY_SCORE: 35
ADLS_ACUITY_SCORE: 35

## 2023-11-25 NOTE — ED PROVIDER NOTES
History     Chief Complaint:  Syncope and Fall       HPI   Austin Garcia is a 78 year old male with history of hypertension, hyperlipidemia, reported history of TIA presents to the emergency department for dizziness and fall.  Patient reports that around 5 AM this morning, he got up to go use the restroom when he suddenly experienced room spinning dizziness which led to him falling backwards landing predominantly on his left upper shoulder and bumping the back of his head.  Patient denies any loss of consciousness and is not on blood thinners.  Patient was able to get himself back up and go to the restroom to urinate.  Patient states that while urinating, he had another episode of dizziness which made him feel like he was going to pass out.  Patient however was able to get himself back into bed and lay down.  Patient had associated nausea.  This has however since resolved when EMS arrived on scene.  However, he was advised to come into the ER for further evaluation.  Patient admits to being more dehydrated recently and wonders whether or not this is contributing to his symptoms.  Patient states that he has had intermittent vertigo type symptoms for the past few days.  Patient states that he has had similar symptoms in the past before for which she was described with a TIA.  He also reports that he was found to have a meningioma on prior imaging and was told that he needs to get an MRI every 6 months for evaluation.    Review of Systems   Constitutional:  Negative for chills and fever.   HENT:  Negative for congestion and rhinorrhea.    Respiratory:  Negative for cough and shortness of breath.    Cardiovascular:  Negative for chest pain.   Gastrointestinal:  Negative for abdominal pain, nausea and vomiting.   Genitourinary:  Negative for dysuria and hematuria.   Musculoskeletal:  Negative for back pain and neck pain.   Neurological:  Positive for light-headedness and headaches. Negative for syncope, weakness and  numbness.       Independent Historian:   None - Patient Only    Review of External Notes:   11/6/2022 MRI findings in which he has a right parietal convexity meningioma with mass effect on the right precentral gyrus.  8/31/2023 family medicine office visit note.    Medications:    Escitalopram Oxalate (LEXAPRO PO)  LISINOPRIL PO  oxyCODONE-acetaminophen (PERCOCET) 5-325 MG per tablet        Past Medical History:    Mixed hyperlipidemia  Vitamin D deficiency  Acute prostatitis  Anxiety  HTN (hypertension)   Epiretinal membrane, left eye  Colon polyps   Hemianopia, homonymous, left   TIA (transient ischemic attack)   Lumbosacral plexopathy   Paresis of lower extremity   Visual disturbance  Meningioma    Past Surgical History:    Appendectomy     Physical Exam   Patient Vitals for the past 24 hrs:   BP Temp Temp src Pulse Resp SpO2   11/25/23 1300 122/77 -- -- 78 -- 98 %   11/25/23 0556 (!) 131/90 97  F (36.1  C) Temporal 65 18 97 %      Constitutional:       General: Not in acute distress.     Appearance: Normal appearance  HENT:      Head: Normocephalic and atraumatic.  No Gutiérrez sign.  No raccoons eyes.  Eyes:     Extraocular Movements: Extraocular movements intact.      Conjunctiva/sclera: Conjunctivae normal.      Pupils: Pupils are equal, round, and reactive to light.   Cardiovascular:     Rate and Rhythm: Normal rate and regular rhythm.   Pulmonary:      Effort: Pulmonary effort is normal.      Breath sounds: Normal breath sounds.   Abdominal:      General: Abdomen is flat. There is no distension.      Palpations: Abdomen is soft.      Tenderness: There is no abdominal tenderness.   Musculoskeletal:      Cervical back: Normal range of motion and neck supple. No rigidity.  No midline cervical spine tenderness to palpation.     General: No swelling or deformity.  Left lower trap tenderness to palpation.  Normal range of motion of shoulders.  No tenderness to palpation of the scapula or posterior chest  wall.  Skin:     General: Skin is warm and dry.   Neurological:      General: No focal deficit present.     NIHSS: Patient alert and keenly responsive. Able to answer month and age. Able and blink eyes and squeeze hands. No gaze palsy. No visual field deficits. No facial palsy. No arm drift bilaterally. No leg drift bilaterally. No limb ataxia. Able to complete finger nose finger and heel to shin. No sensory deficits. No language deficits/aphasia. No dysarthria. No extinction/inattention.     NIHSS score of 0.       Mental Status: Alert and oriented to person, place, and time.   Psychiatric:         Mood and Affect: Mood normal.         Behavior: Behavior normal.     Emergency Department Course   ECG     See ED course for independent interpretation.     Imaging:  CTA Head Neck with Contrast   Final Result   IMPRESSION:    HEAD CTA:    1.  Normal CTA [place].      NECK CTA:   1.  Normal neck CTA.         CT Head w/o Contrast   Final Result   IMPRESSION:   1.  Tiny right frontal meningioma again noted.   2.  No CT evidence for acute intracranial process.   3.  Brain atrophy and presumed chronic microvascular ischemic changes as above.         MR Brain w/o & w Contrast   Final Result   IMPRESSION:   1.  Small posterolateral right frontal meningioma again noted.   2.  No acute intracranial process.   3.  Generalized brain atrophy and presumed microvascular ischemic changes as detailed above.               Report per radiology    Laboratory:  Labs Ordered and Resulted from Time of ED Arrival to Time of ED Departure   BASIC METABOLIC PANEL - Abnormal       Result Value    Sodium 138      Potassium 4.2      Chloride 103      Carbon Dioxide (CO2) 25      Anion Gap 10      Urea Nitrogen 12.9      Creatinine 0.95      GFR Estimate 83      Calcium 9.0      Glucose 132 (*)    ROUTINE UA WITH MICROSCOPIC REFLEX TO CULTURE - Abnormal    Color Urine Light Yellow      Appearance Urine Clear      Glucose Urine Negative       Bilirubin Urine Negative      Ketones Urine Negative      Specific Gravity Urine 1.035      Blood Urine Negative      pH Urine 6.0      Protein Albumin Urine Negative      Urobilinogen Urine Normal      Nitrite Urine Negative      Leukocyte Esterase Urine Negative      Mucus Urine Present (*)     RBC Urine <1      WBC Urine 1     TROPONIN T, HIGH SENSITIVITY - Normal    Troponin T, High Sensitivity 10     MAGNESIUM - Normal    Magnesium 2.2     INFLUENZA A/B, RSV, & SARS-COV2 PCR - Normal    Influenza A PCR Negative      Influenza B PCR Negative      RSV PCR Negative      SARS CoV2 PCR Negative     TROPONIN T, HIGH SENSITIVITY - Normal    Troponin T, High Sensitivity 10     CBC WITH PLATELETS AND DIFFERENTIAL    WBC Count 8.4      RBC Count 4.56      Hemoglobin 15.0      Hematocrit 42.6      MCV 93      MCH 32.9      MCHC 35.2      RDW 12.8      Platelet Count 414      % Neutrophils 59      % Lymphocytes 31      % Monocytes 8      % Eosinophils 1      % Basophils 0      % Immature Granulocytes 1      NRBCs per 100 WBC 0      Absolute Neutrophils 5.0      Absolute Lymphocytes 2.6      Absolute Monocytes 0.7      Absolute Eosinophils 0.1      Absolute Basophils 0.0      Absolute Immature Granulocytes 0.0      Absolute NRBCs 0.0          Emergency Department Course & Assessments:       Interventions:  Medications   lactated ringers BOLUS 1,000 mL (0 mLs Intravenous Stopped 11/25/23 1437)   gadobutrol (GADAVIST) injection 9 mL (9 mLs Intravenous $Given 11/25/23 1142)   CT Scan Flush (80 mLs Intravenous $Given 11/25/23 1237)   iopamidol (ISOVUE-370) solution 500 mL (67 mLs Intravenous $Given 11/25/23 1237)   acetaminophen (TYLENOL) tablet 650 mg (650 mg Oral $Given 11/25/23 1437)        Independent Interpretation (X-rays, CTs, rhythm strip):  None    Assessments/Consultations/Discussion of Management or Tests:  ED Course as of 11/25/23 2215   Sat Nov 25, 2023   1051 EKG 12 lead  Normal sinus rhythm.  Rate of 74.  .   .  QTc 439.  No acute ST elevation or depression as compared with 11/8/2017 EKG.   1150 Troponin T, High Sensitivity: 10  2 set negative, unlikely ACS   1259 MR Brain w/o & w Contrast  Prior meningioma unchanged, vasogenic edema seen on prior outside facility MRI.   1435 Patient updated on lab and image findings.  Patient has been able to ambulate independently to and from restroom.  No further episodes of near syncope in the ER.  Updated on meningioma on MRI and advised for patient to follow-up with neurosurgeon regarding today's imaging.  No significant change from prior.  Patient states that he has had history of vasovagal type syncope such as a hot, cardia, and scary situations.  No syncope today, but lightheadedness likely component of vasovagal reaction especially during urinary void and going from sitting to standing on the bed.  No evidence of acute intracranial injuries, aneurysm, hemorrhaging, or CVA.  While patient follow-up outpatient with PCP.  It is possible that patient symptoms today may be secondary to mild dehydration which patient states that he has not been drinking enough fluids.  Discussed strict return precautions.  Answered all questions.  Patient and family feel comfortable with plan for discharge.       Social Determinants of Health affecting care:   None    Disposition:  The patient was discharged to home.     Impression & Plan    CMS Diagnoses: None    NEXUS CRITERIA for C-Spine Indications (calculator)  Background  Unreliable under age 2 (and interpret with caution in under age 8 years old)  C-spine xray indications include posterior midline cervical tenderness,  intoxication, altered consciousness, focal neurologic deficit and distracting injury.  Data  78 year old   has no history on file for alcohol use.  Criteria   Of possible 5 possible items (all criteria must be present):  No posterior midline cervical tenderness  No alcohol intoxication  Normal level of alertness  No  focal neurologic deficit  No distracting injury  Interpretation  All five criteria are met: No c-spine imaging is required    Medical Decision Makin-year-old male as described above presents to the emergency department for near syncopal episode in addition to vertigo episode.  Patient had associated subsequent fall landing predominantly on left upper trap and bumping the back of his head.  No loss of consciousness.  Not on blood thinners.  History of meningioma with mass effect per prior MRI review.  Broad differential diagnosis considered includes, but not limited to, dehydration, orthostatic hypotension, vasovagal near syncope, cardiac dysrhythmia/near syncope, TIA/CVA, vestibular neuritis/labyrinthitis, and acute viral syndrome.  IV fluids ordered.  Meclizine/Zofran as needed for symptom control.  Will obtain CT head and CTA head and neck for evaluation for ICH in addition to potential vessel occlusion.  Given history of mass effect on prior MRI, will repeat MRI brain imaging for evaluation for acute changes contributing to patient's symptoms today and also evaluate for CVA/TIA.  Discussed care plan with patient who voiced understanding and agreement with plan.  Answered all questions.  Additional workup and orders as listed in chart.    Please refer to ED course above for details on the patient's treatment course and any changes or updates in care plan beyond my initial evaluation and MDM.      Diagnosis:    ICD-10-CM    1. Vasovagal near syncope  R55       2. Nonintractable headache, unspecified chronicity pattern, unspecified headache type  R51.9       3. Dizziness  R42       4. Strain of neck muscle, initial encounter  S16.1XXA       5. Meningioma (H)  D32.9            Discharge Medications:  Discharge Medication List as of 2023  2:41 PM             NITA LEW DO  2023   Nita Lew DO Yeh, [place], DO  [phone]

## 2023-11-25 NOTE — ED TRIAGE NOTES
Pt to ER w c/o syncopal episode resulting in fall. Pt states he got up around 0500 to use the BR and got super dizzy falling backwards hitting his head. Pt was able to get up and lay in the bed. Pt endorsed nausea after the incident but that resolved. States EMS checked pt out and they recommended him come in and get seen. Pt not on thinners. VSS, A&Ox4, ABCs intact.

## 2023-11-25 NOTE — DISCHARGE INSTRUCTIONS
Please follow-up with your primary care provider and/or specialist regarding your visit to the ER today.    Please return to the emergency department should you experience any of the symptoms we specifically discussed, including but not limited to recurrence or worsening of your symptoms, or development of any new and concerning symptoms such as chest pain, shortness of breath, numbness, or weakness    Please contact your neurosurgeon regarding your MRI brain finding today of meningioma.

## 2023-11-27 LAB
ATRIAL RATE - MUSE: 63 BPM
ATRIAL RATE - MUSE: 74 BPM
DIASTOLIC BLOOD PRESSURE - MUSE: NORMAL MMHG
DIASTOLIC BLOOD PRESSURE - MUSE: NORMAL MMHG
INTERPRETATION ECG - MUSE: NORMAL
INTERPRETATION ECG - MUSE: NORMAL
P AXIS - MUSE: 26 DEGREES
P AXIS - MUSE: 39 DEGREES
PR INTERVAL - MUSE: 188 MS
PR INTERVAL - MUSE: 200 MS
QRS DURATION - MUSE: 124 MS
QRS DURATION - MUSE: 124 MS
QT - MUSE: 396 MS
QT - MUSE: 418 MS
QTC - MUSE: 427 MS
QTC - MUSE: 439 MS
R AXIS - MUSE: -47 DEGREES
R AXIS - MUSE: -56 DEGREES
SYSTOLIC BLOOD PRESSURE - MUSE: NORMAL MMHG
SYSTOLIC BLOOD PRESSURE - MUSE: NORMAL MMHG
T AXIS - MUSE: -1 DEGREES
T AXIS - MUSE: 16 DEGREES
VENTRICULAR RATE- MUSE: 63 BPM
VENTRICULAR RATE- MUSE: 74 BPM